# Patient Record
Sex: FEMALE | Race: BLACK OR AFRICAN AMERICAN | NOT HISPANIC OR LATINO | Employment: UNEMPLOYED | URBAN - METROPOLITAN AREA
[De-identification: names, ages, dates, MRNs, and addresses within clinical notes are randomized per-mention and may not be internally consistent; named-entity substitution may affect disease eponyms.]

---

## 2022-10-11 ENCOUNTER — HOSPITAL ENCOUNTER (EMERGENCY)
Facility: HOSPITAL | Age: 37
Discharge: HOME | End: 2022-10-11
Attending: EMERGENCY MEDICINE
Payer: MEDICAID

## 2022-10-11 VITALS
BODY MASS INDEX: 27.02 KG/M2 | WEIGHT: 143 LBS | RESPIRATION RATE: 15 BRPM | OXYGEN SATURATION: 100 % | TEMPERATURE: 99 F | SYSTOLIC BLOOD PRESSURE: 135 MMHG | DIASTOLIC BLOOD PRESSURE: 65 MMHG | HEART RATE: 95 BPM

## 2022-10-11 DIAGNOSIS — S93.401A SPRAIN OF RIGHT ANKLE, UNSPECIFIED LIGAMENT, INITIAL ENCOUNTER: Primary | ICD-10-CM

## 2022-10-11 DIAGNOSIS — T14.90XA INJURY: ICD-10-CM

## 2022-10-11 PROCEDURE — 99283 EMERGENCY DEPT VISIT LOW MDM: CPT

## 2022-10-11 NOTE — ED PROVIDER NOTES
SCRIBE #1 NOTE: I, Heidi Lewis, am scribing for, and in the presence of, Liam Moore Jr., MD. I have scribed the entire note.      History      Chief Complaint   Patient presents with    Ankle Pain     Pt twisted her ankle Sunday morning while walking. Went to  and states they didn't do anything. Woke up this morning with increased swelling and pain to ankle. +PMS       Review of patient's allergies indicates:   Allergen Reactions    Betadine [povidone-iodine]         HPI   HPI    10/11/2022, 10:50 AM   History obtained from the patient      History of Present Illness: Lucia Goncalves is a 37 y.o. female patient who presents to the Emergency Department for R ankle pain which onset gradually 2 days ago after she twisted her ankle. Symptoms are constant and moderate in severity. No mitigating or exacerbating factors reported. Associated sxs include R ankle swelling. Patient denies any fever, chills, calf pain, SOB, CP, numbness, weakness, and all other sxs at this time. Pt was seen at an Urgent Care for this complaint, but she states nothing was done there. No further complaints or concerns at this time.         Arrival mode: Personal vehicle      PCP: Primary Doctor No       Past Medical History:  No past medical history on file.    Past Surgical History:  Past Surgical History:   Procedure Laterality Date    none           Family History:  Family History   Problem Relation Age of Onset    Heart disease Father     Diabetes Maternal Grandmother     Hypertension Maternal Grandfather        Social History:  Social History     Tobacco Use    Smoking status: Never    Smokeless tobacco: Not on file   Substance and Sexual Activity    Alcohol use: No    Drug use: No    Sexual activity: Not on file       ROS   Review of Systems   Constitutional:  Negative for chills and fever.   HENT:  Negative for sore throat.    Respiratory:  Negative for shortness of breath.    Cardiovascular:  Negative for chest pain.    Gastrointestinal:  Negative for nausea.   Genitourinary:  Negative for dysuria.   Musculoskeletal:  Positive for arthralgias (R ankle) and joint swelling (R ankle). Negative for back pain.        (-) calf pain   Skin:  Negative for rash.   Neurological:  Negative for weakness and numbness.   Hematological:  Does not bruise/bleed easily.   All other systems reviewed and are negative.    Physical Exam      Initial Vitals [10/11/22 1042]   BP Pulse Resp Temp SpO2   135/65 95 15 98.8 °F (37.1 °C) 100 %      MAP       --          Physical Exam  Nursing Notes and Vital Signs Reviewed.  Constitutional: Patient is in no acute distress. Well-developed and well-nourished.  Head: Atraumatic. Normocephalic.  Eyes: PERRL. EOM intact. Conjunctivae are not pale. No scleral icterus.  ENT: Mucous membranes are moist. Oropharynx is clear and symmetric.    Neck: Supple. Full ROM. No lymphadenopathy.  Cardiovascular: Regular rate. Regular rhythm. No murmurs, rubs, or gallops. Distal pulses are 2+ and symmetric.  Pulmonary/Chest: No respiratory distress. Clear to auscultation bilaterally. No wheezing or rales.  Abdominal: Soft and non-distended.  There is no tenderness.  No rebound, guarding, or rigidity.  Musculoskeletal: Moves all extremities. No obvious deformities. There is pain to the R lateral malleolus with a small amount of swelling. Pt is neurovascularly intact.   Neurological:  Alert, awake, and appropriate.  Normal speech. No acute focal neurological deficits are appreciated.  Psychiatric: Normal affect. Good eye contact. Appropriate in content.    ED Course    Procedures  ED Vital Signs:  Vitals:    10/11/22 1042 10/11/22 1044   BP: 135/65    Pulse: 95    Resp: 15    Temp: 98.8 °F (37.1 °C)    TempSrc: Oral    SpO2: 100%    Weight:  64.9 kg (143 lb)       Abnormal Lab Results:  Labs Reviewed - No data to display         Imaging Results:  Imaging Results              X-Ray Ankle Complete Right (Final result)  Result time  10/11/22 11:20:22      Final result by JONATHAN Cortes Sr., MD (10/11/22 11:20:22)                   Impression:      Normal study.      Electronically signed by: Mike Cortes MD  Date:    10/11/2022  Time:    11:20               Narrative:    EXAMINATION:  XR ANKLE COMPLETE 3 VIEW RIGHT    CLINICAL HISTORY:  Injury, unspecified, initial encounter    COMPARISON:  None    FINDINGS:  There is no fracture. There is no dislocation.                                              The Emergency Provider reviewed the vital signs and test results, which are outlined above.    ED Discussion     11:41 AM: Reassessed pt at this time. Discussed with pt all pertinent ED information and results. Discussed pt dx and plan of tx. Gave pt all f/u and return to the ED instructions. All questions and concerns were addressed at this time. Pt expresses understanding of information and instructions, and is comfortable with plan to discharge. Pt is stable for discharge.    I discussed with patient and/or family/caretaker that evaluation in the ED does not suggest any emergent or life threatening medical conditions requiring immediate intervention beyond what was provided in the ED, and I believe patient is safe for discharge.  Regardless, an unremarkable evaluation in the ED does not preclude the development or presence of a serious of life threatening condition. As such, patient was instructed to return immediately for any worsening or change in current symptoms.           ED Medication(s):  Medications - No data to display     Follow-up Information       [facility].    Specialty: Orthopedics  Contact information:  [address] [facility] [address] [phone]             [facility] Emergency Dept..    Specialty: Emergency Medicine  Why: If symptoms worsen  Contact information:  [address] [facility] [id number] [phone]                         Discharge Medication List as of  10/11/2022 11:40 AM              Medical Decision Making    Medical Decision Making:   Clinical Tests:   Radiological Study: Ordered and Reviewed         Scribe Attestation:   Scribe #1: I performed the above scribed service and the documentation accurately describes the services I performed. I attest to the accuracy of the note.    Attending:   Physician Attestation Statement for Scribe #1: I, Liam Moore Jr., MD, personally performed the services described in this documentation, as scribed by Heidi Lewis, in my presence, and it is both accurate and complete.          Clinical Impression       ICD-10-CM ICD-9-CM   1. Sprain of right ankle, unspecified ligament, initial encounter  S93.401A 845.00   2. Injury  T14.90XA 959.9       Disposition:   Disposition: Discharged  Condition: Stable       Liam Moore Jr., MD  10/11/22 1834